# Patient Record
Sex: FEMALE | Race: WHITE | NOT HISPANIC OR LATINO | URBAN - METROPOLITAN AREA
[De-identification: names, ages, dates, MRNs, and addresses within clinical notes are randomized per-mention and may not be internally consistent; named-entity substitution may affect disease eponyms.]

---

## 2021-08-30 ENCOUNTER — APPOINTMENT (RX ONLY)
Dept: URBAN - METROPOLITAN AREA CLINIC 26 | Facility: CLINIC | Age: 46
Setting detail: DERMATOLOGY
End: 2021-08-30

## 2021-08-30 DIAGNOSIS — L81.4 OTHER MELANIN HYPERPIGMENTATION: ICD-10-CM

## 2021-08-30 PROCEDURE — ? TREATMENT REGIMEN

## 2021-08-30 PROCEDURE — 99202 OFFICE O/P NEW SF 15 MIN: CPT

## 2021-08-30 PROCEDURE — ? OBSERVATION AND MEASURE

## 2021-08-30 PROCEDURE — ? ADDITIONAL NOTES

## 2021-08-30 PROCEDURE — ? PHOTO-DOCUMENTATION

## 2021-08-30 PROCEDURE — ? COUNSELING

## 2021-08-30 ASSESSMENT — LOCATION ZONE DERM: LOCATION ZONE: ARM

## 2021-08-30 ASSESSMENT — LOCATION DETAILED DESCRIPTION DERM: LOCATION DETAILED: RIGHT DISTAL DORSAL FOREARM

## 2021-08-30 ASSESSMENT — LOCATION SIMPLE DESCRIPTION DERM: LOCATION SIMPLE: RIGHT FOREARM

## 2021-08-30 NOTE — PROCEDURE: MIPS QUALITY
Quality 431: Preventive Care And Screening: Unhealthy Alcohol Use - Screening: Patient identified as an unhealthy alcohol user when screened for unhealthy alcohol use using a systematic screening method and received brief counseling
Quality 226: Preventive Care And Screening: Tobacco Use: Screening And Cessation Intervention: Patient screened for tobacco use and is an ex/non-smoker
Quality 130: Documentation Of Current Medications In The Medical Record: Current Medications Documented
Detail Level: Detailed

## 2022-10-26 ENCOUNTER — APPOINTMENT (RX ONLY)
Dept: URBAN - METROPOLITAN AREA CLINIC 26 | Facility: CLINIC | Age: 47
Setting detail: DERMATOLOGY
End: 2022-10-26

## 2022-10-26 DIAGNOSIS — D69.2 OTHER NONTHROMBOCYTOPENIC PURPURA: ICD-10-CM

## 2022-10-26 PROBLEM — L30.9 DERMATITIS, UNSPECIFIED: Status: ACTIVE | Noted: 2022-10-26

## 2022-10-26 PROCEDURE — ? BIOPSY BY SHAVE METHOD

## 2022-10-26 PROCEDURE — 11102 TANGNTL BX SKIN SINGLE LES: CPT

## 2022-10-26 ASSESSMENT — LOCATION SIMPLE DESCRIPTION DERM: LOCATION SIMPLE: PLANTAR SURFACE OF LEFT 1ST TOE

## 2022-10-26 ASSESSMENT — LOCATION DETAILED DESCRIPTION DERM: LOCATION DETAILED: TIP OF LEFT 1ST TOE

## 2022-10-26 ASSESSMENT — LOCATION ZONE DERM: LOCATION ZONE: TOE

## 2023-03-09 ENCOUNTER — APPOINTMENT (RX ONLY)
Dept: URBAN - METROPOLITAN AREA CLINIC 26 | Facility: CLINIC | Age: 48
Setting detail: DERMATOLOGY
End: 2023-03-09

## 2023-03-09 DIAGNOSIS — D18.0 HEMANGIOMA: ICD-10-CM

## 2023-03-09 DIAGNOSIS — L81.4 OTHER MELANIN HYPERPIGMENTATION: ICD-10-CM

## 2023-03-09 DIAGNOSIS — T07XXXA ABRASION OR FRICTION BURN OF OTHER, MULTIPLE, AND UNSPECIFIED SITES, WITHOUT MENTION OF INFECTION: ICD-10-CM

## 2023-03-09 DIAGNOSIS — L85.3 XEROSIS CUTIS: ICD-10-CM

## 2023-03-09 DIAGNOSIS — L81.5 LEUKODERMA, NOT ELSEWHERE CLASSIFIED: ICD-10-CM

## 2023-03-09 DIAGNOSIS — L82.1 OTHER SEBORRHEIC KERATOSIS: ICD-10-CM

## 2023-03-09 DIAGNOSIS — D22 MELANOCYTIC NEVI: ICD-10-CM

## 2023-03-09 PROBLEM — S10.81XA ABRASION OF OTHER SPECIFIED PART OF NECK, INITIAL ENCOUNTER: Status: ACTIVE | Noted: 2023-03-09

## 2023-03-09 PROBLEM — D22.5 MELANOCYTIC NEVI OF TRUNK: Status: ACTIVE | Noted: 2023-03-09

## 2023-03-09 PROBLEM — D18.01 HEMANGIOMA OF SKIN AND SUBCUTANEOUS TISSUE: Status: ACTIVE | Noted: 2023-03-09

## 2023-03-09 PROCEDURE — ? SUNSCREEN RECOMMENDATIONS

## 2023-03-09 PROCEDURE — ? GENTLE SKIN CARE INSTRUCTIONS

## 2023-03-09 PROCEDURE — ? OBSERVATION

## 2023-03-09 PROCEDURE — 99213 OFFICE O/P EST LOW 20 MIN: CPT

## 2023-03-09 PROCEDURE — ? FULL BODY SKIN EXAM

## 2023-03-09 PROCEDURE — ? ADDITIONAL NOTES

## 2023-03-09 PROCEDURE — ? COUNSELING

## 2023-03-09 ASSESSMENT — LOCATION ZONE DERM
LOCATION ZONE: ARM
LOCATION ZONE: TRUNK
LOCATION ZONE: HAND
LOCATION ZONE: NECK
LOCATION ZONE: LEG

## 2023-03-09 ASSESSMENT — LOCATION SIMPLE DESCRIPTION DERM
LOCATION SIMPLE: CHEST
LOCATION SIMPLE: RIGHT FOREARM
LOCATION SIMPLE: NECK
LOCATION SIMPLE: RIGHT UPPER BACK
LOCATION SIMPLE: RIGHT PRETIBIAL REGION
LOCATION SIMPLE: LEFT HAND
LOCATION SIMPLE: LEFT PRETIBIAL REGION
LOCATION SIMPLE: RIGHT HAND
LOCATION SIMPLE: RIGHT LOWER BACK
LOCATION SIMPLE: UPPER BACK

## 2023-03-09 ASSESSMENT — LOCATION DETAILED DESCRIPTION DERM
LOCATION DETAILED: LEFT PROXIMAL PRETIBIAL REGION
LOCATION DETAILED: LEFT ULNAR DORSAL HAND
LOCATION DETAILED: MIDDLE STERNUM
LOCATION DETAILED: RIGHT SUPERIOR LATERAL NECK
LOCATION DETAILED: RIGHT RADIAL DORSAL HAND
LOCATION DETAILED: RIGHT PROXIMAL PRETIBIAL REGION
LOCATION DETAILED: RIGHT INFERIOR LATERAL MIDBACK
LOCATION DETAILED: RIGHT PROXIMAL DORSAL FOREARM
LOCATION DETAILED: SUPERIOR THORACIC SPINE
LOCATION DETAILED: RIGHT INFERIOR UPPER BACK

## 2023-03-09 NOTE — PROCEDURE: ADDITIONAL NOTES
Render Risk Assessment In Note?: no
Detail Level: Simple
Additional Notes: Picking x 2 months. Yellow pustule in center of hemorrhagic crust. Suspect excoriated folliculitis. Stop picking and let heal and follow up if unresolved.
Additional Notes: Very few nevi

## 2023-07-11 ENCOUNTER — TELEPHONE (OUTPATIENT)
Dept: NEUROSURGERY | Facility: CLINIC | Age: 48
End: 2023-07-11
Payer: COMMERCIAL
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
## 2023-07-17 DIAGNOSIS — M47.812 CERVICAL SPONDYLOSIS: Primary | ICD-10-CM

## 2023-07-17 NOTE — TELEPHONE ENCOUNTER
Patient scheduled 8/17 @ 1pm. NP Advised to bring Remy MRI disc and Antonio xray disc. Advised to have xray prior and bring disc. NP ppw and xray script emailed.

## 2023-07-20 ENCOUNTER — HOSPITAL ENCOUNTER (OUTPATIENT)
Dept: RADIOLOGY | Age: 48
Discharge: HOME | End: 2023-07-20
Attending: PHYSICIAN ASSISTANT
Payer: COMMERCIAL

## 2023-07-20 DIAGNOSIS — M47.812 CERVICAL SPONDYLOSIS: ICD-10-CM

## 2023-07-20 PROCEDURE — 72052 X-RAY EXAM NECK SPINE 6/>VWS: CPT

## 2023-08-23 ENCOUNTER — OFFICE VISIT (OUTPATIENT)
Dept: NEUROSURGERY | Facility: CLINIC | Age: 48
End: 2023-08-23
Payer: COMMERCIAL

## 2023-08-23 VITALS
BODY MASS INDEX: 22.36 KG/M2 | RESPIRATION RATE: 14 BRPM | OXYGEN SATURATION: 99 % | DIASTOLIC BLOOD PRESSURE: 72 MMHG | SYSTOLIC BLOOD PRESSURE: 141 MMHG | HEIGHT: 69 IN | TEMPERATURE: 97.2 F | HEART RATE: 58 BPM | WEIGHT: 151 LBS

## 2023-08-23 DIAGNOSIS — M47.12 CERVICAL SPONDYLOSIS WITH MYELOPATHY: Primary | ICD-10-CM

## 2023-08-23 PROCEDURE — 99205 OFFICE O/P NEW HI 60 MIN: CPT | Performed by: NEUROLOGICAL SURGERY

## 2023-08-23 PROCEDURE — 3008F BODY MASS INDEX DOCD: CPT | Performed by: NEUROLOGICAL SURGERY

## 2023-08-23 NOTE — LETTER
"August 23, 2023     RITA Munoz  [address] [facility]  [address]  [place] [address]    Patient: Eileen Ma  YOB: 1975  Date of Visit: 8/23/2023      Dear Dr. White:    Thank you for referring Eileen Ma to me for evaluation. Below are my notes for this consultation.    If you have questions, please do not hesitate to call me. I look forward to following your patient along with you.         Sincerely,        Doc Crawford MD        CC: No Recipients    Doc Crawford MD  8/23/2023 11:08 AM  Signed  Dear Viridiana,    I had the pleasure of meeting a patient of yours in the office today.  Thank you for your kind referral.  As you may recall, Mrs. Eileen Ma is a pleasant 48-year-old female who is presenting with upper extremity paresthesias and numbness is primarily present with extension maneuvers of her neck and with sleep.  This has been occurring since January 2021 but has been noticeably worse in recent months.  She is not currently experiencing any neck pain or cervical radiculopathy.  However she has almost constant pins-and-needles which progresses to numbness in her arms, hands, and back of her head when flat in bed.  She has worked on repositioning and finds that some of the symptoms have improved with repositioning of late.  At times when her symptoms are severe she can also feel sensory changes in her lower extremities.  The symptoms do not occur as often when she is upright and moving around.  In fact she states that with activity only paresthesias and numbness are better.  However, upon further questioning, does feel as if her hands do have \"a burning sensation to them\" even now.  She has noticed left upper extremity fasciculations upon extension maneuvers of her neck which improves with flexion.  In addition she has spots of stable numbness in the hands, primarily in the first and second digits, but also on the palmar surfaces and dorsal surfaces of her hands which " can last for weeks at a time.  She denies any focal neurologic deficits, loss of hand function even temporarily, difficulty with fine motor skills, dropping items, gait instability, falls, or change in bladder or bowel function.  She has obtained multiple surgical opinions, primarily due to the differing options that have been presented to her, and presents today for a Neurosurgical consultation.     Imaging: I personally reviewed her 2016 and current MRI studies of the cervical spine and ordered x-rays with static and dynamic views.  5716 MRI shows a diffuse disc herniation centrally at C4-5 level compressing the spinal cord without causing obvious signal change although there may be very subtle signal change.  The disc height was fairly well-preserved on that study.  Her 2023 MRI shows increased loss of height by 2 to 3 mm with increased bulging of the disc material into the canal.  Her spinal cord is now quite flattened and there is signal change in the cord.  Her x-rays show the loss of height at C4-5 and overall straightening of the normal cervical lordosis but there is no evidence of instability.  The remaining levels of her spine appear very healthy.                2016 MRI showing         2023 MRI           Review of Systems: 14 point review of systems are negative except for the following pertinent positives: Neuropathy, numbness, and anxiety.    Medical History:   Past Medical History:   Diagnosis Date   • Celiac disease        Surgical History: History reviewed. No pertinent surgical history.    Social History:   Social History     Socioeconomic History   • Marital status:      Spouse name: None   • Number of children: None   • Years of education: None   • Highest education level: None       Family History: History reviewed. No pertinent family history.    Allergies: Gadolinium-containing contrast media and Gluten    Home Medications:  Not in a hospital admission.    Current Medications:  •   ergocalciferol, vitamin D2, (VITAMIN D2 ORAL)      Physicial Exam    Vital Signs   Vitals:    08/23/23 0954   BP: (!) 141/72   Pulse: (!) 58   Resp: 14   Temp: 36.2 °C (97.2 °F)   SpO2: 99%       Awake, alert, oriented to person, place, time and situation; speech and fund of knowledge normal. Head NC/AT.  PERRL, extra-ocular movements intact, face symmetric.  Neck is supple, has full range of motion.  There is no tenderness.  Breathing comfortably without distress, tachypnea, wheezing or use of accessory muscles.  Heart has a regular rate. Abdomen ND.   Skin is warm, well perfused.  Limbs show no deformities or edema. Muscle bulk and tone are normal.     Neurological examination:  Motor:     RUE:  D  5/5, B  5/5, T 5/5, WE 5/5, HG 5/5, IH 5/5   LUE:  D  5/5, B  5/5, T 5/5, WE 5/5, HG 5/5, IH 5/5   RLE:  IP  5/5, Q  5/5, DF 5/5, EHL 5/5, PF 5/5   LLE:  IP  5/5, Q  5/5, DF 5/5, EHL 5/5, PF 5/5  Reflexes:  Normoreflexive, no Melgar's, no clonus  Sensory exam:  Intact to light touch however she has mild numbness with light touch in bilateral hands.   Gait and posture normal.      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  She has early symptoms consistent with myelopathy but no objective findings of myelopathy on examination such as pathological reflexes, etc.  I do believe she is a candidate for surgery.  She has seen for prior opinions between orthopedic and neurosurgical spinal surgeons.  I am her fifth opinion.  Given that she has symptoms, although no objective signs of myelopathy, and that they have been worsening in terms of hand and arm numbness, twitching and tremoring of her arms in certain positions, I do believe she is symptomatic from this and would benefit from surgery.  She is looking for a definitive answer to wait or proceed with surgery and a definitive answer on the optimal surgery.    We discussed both C4-5 ACDF and total disc  replacement (arthroplasty).  I discussed the pros and cons of each approach and how I go about selecting patients for either approach.  I do believe she is a candidate for either procedure.  Concerning arthroplasty, she does not have much loss of height of the disc space, nor does she have excessive spondylosis and osteophyte formation or instability that would contraindicate it.  I also do not recommend arthroplasty in the setting of congenital canal narrowing but her canal overall appears very patent except at the level she has the herniated disc.  Therefore, given her age, she is a good candidate for arthroplasty in my opinion.  She is very concerned about having a mobile device in her neck and is very concerned about the case reports of movement of the device or migration of the device.  I explained that is a rare occurrence.  We also discussed the other possibilities and risks of fusion such as failed fusion and we discussed adjacent segment degeneration in detail.  She would feel more comfortable, she states, with a fusion as opposed to an arthroplasty and I explained this is a perfectly valid surgery and course of treatment.  It is a tried and true method that has been around since 1958, although our technologies have improved.    She is considering having surgery at [facility] [place] as they will have more resources available in her opinion.  I reassured her that she is in excellent hands with Dr. Robbins.  All questions were answered to the best of my abilities.  Thank you very much for the opportunity to be involved in the care of your patient.  Please call the office should any questions or problems arise.    Sincerely,       Doc Crawford MD

## 2023-08-23 NOTE — PROGRESS NOTES
"Dear Viridiana,    I had the pleasure of meeting a patient of yours in the office today.  Thank you for your kind referral.  As you may recall, Mrs. Eileen Ma is a pleasant 48-year-old female who is presenting with upper extremity paresthesias and numbness is primarily present with extension maneuvers of her neck and with sleep.  This has been occurring since January 2021 but has been noticeably worse in recent months.  She is not currently experiencing any neck pain or cervical radiculopathy.  However she has almost constant pins-and-needles which progresses to numbness in her arms, hands, and back of her head when flat in bed.  She has worked on repositioning and finds that some of the symptoms have improved with repositioning of late.  At times when her symptoms are severe she can also feel sensory changes in her lower extremities.  The symptoms do not occur as often when she is upright and moving around.  In fact she states that with activity only paresthesias and numbness are better.  However, upon further questioning, does feel as if her hands do have \"a burning sensation to them\" even now.  She has noticed left upper extremity fasciculations upon extension maneuvers of her neck which improves with flexion.  In addition she has spots of stable numbness in the hands, primarily in the first and second digits, but also on the palmar surfaces and dorsal surfaces of her hands which can last for weeks at a time.  She denies any focal neurologic deficits, loss of hand function even temporarily, difficulty with fine motor skills, dropping items, gait instability, falls, or change in bladder or bowel function.  She has obtained multiple surgical opinions, primarily due to the differing options that have been presented to her, and presents today for a Neurosurgical consultation.     Imaging: I personally reviewed her 2016 and current MRI studies of the cervical spine and ordered x-rays with static and dynamic views.  " 5716 MRI shows a diffuse disc herniation centrally at C4-5 level compressing the spinal cord without causing obvious signal change although there may be very subtle signal change.  The disc height was fairly well-preserved on that study.  Her 2023 MRI shows increased loss of height by 2 to 3 mm with increased bulging of the disc material into the canal.  Her spinal cord is now quite flattened and there is signal change in the cord.  Her x-rays show the loss of height at C4-5 and overall straightening of the normal cervical lordosis but there is no evidence of instability.  The remaining levels of her spine appear very healthy.                2016 MRI showing         2023 MRI           Review of Systems: 14 point review of systems are negative except for the following pertinent positives: Neuropathy, numbness, and anxiety.    Medical History:   Past Medical History:   Diagnosis Date   • Celiac disease        Surgical History: History reviewed. No pertinent surgical history.    Social History:   Social History     Socioeconomic History   • Marital status:      Spouse name: None   • Number of children: None   • Years of education: None   • Highest education level: None       Family History: History reviewed. No pertinent family history.    Allergies: Gadolinium-containing contrast media and Gluten    Home Medications:  Not in a hospital admission.    Current Medications:  •  ergocalciferol, vitamin D2, (VITAMIN D2 ORAL)      Physicial Exam    Vital Signs   Vitals:    08/23/23 0954   BP: (!) 141/72   Pulse: (!) 58   Resp: 14   Temp: 36.2 °C (97.2 °F)   SpO2: 99%       Awake, alert, oriented to person, place, time and situation; speech and fund of knowledge normal. Head NC/AT.  PERRL, extra-ocular movements intact, face symmetric.  Neck is supple, has full range of motion.  There is no tenderness.  Breathing comfortably without distress, tachypnea, wheezing or use of accessory muscles.  Heart has a regular rate.  Abdomen ND.   Skin is warm, well perfused.  Limbs show no deformities or edema. Muscle bulk and tone are normal.     Neurological examination:  Motor:     RUE:  D  5/5, B  5/5, T 5/5, WE 5/5, HG 5/5, IH 5/5   LUE:  D  5/5, B  5/5, T 5/5, WE 5/5, HG 5/5, IH 5/5   RLE:  IP  5/5, Q  5/5, DF 5/5, EHL 5/5, PF 5/5   LLE:  IP  5/5, Q  5/5, DF 5/5, EHL 5/5, PF 5/5  Reflexes:  Normoreflexive, no Melgar's, no clonus  Sensory exam:  Intact to light touch however she has mild numbness with light touch in bilateral hands.   Gait and posture normal.      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  She has early symptoms consistent with myelopathy but no objective findings of myelopathy on examination such as pathological reflexes, etc.  I do believe she is a candidate for surgery.  She has seen for prior opinions between orthopedic and neurosurgical spinal surgeons.  I am her fifth opinion.  Given that she has symptoms, although no objective signs of myelopathy, and that they have been worsening in terms of hand and arm numbness, twitching and tremoring of her arms in certain positions, I do believe she is symptomatic from this and would benefit from surgery.  She is looking for a definitive answer to wait or proceed with surgery and a definitive answer on the optimal surgery.    We discussed both C4-5 ACDF and total disc replacement (arthroplasty).  I discussed the pros and cons of each approach and how I go about selecting patients for either approach.  I do believe she is a candidate for either procedure.  Concerning arthroplasty, she does not have much loss of height of the disc space, nor does she have excessive spondylosis and osteophyte formation or instability that would contraindicate it.  I also do not recommend arthroplasty in the setting of congenital canal narrowing but her canal overall appears very patent except at the level she has the herniated  disc.  Therefore, given her age, she is a good candidate for arthroplasty in my opinion.  She is very concerned about having a mobile device in her neck and is very concerned about the case reports of movement of the device or migration of the device.  I explained that is a rare occurrence.  We also discussed the other possibilities and risks of fusion such as failed fusion and we discussed adjacent segment degeneration in detail.  She would feel more comfortable, she states, with a fusion as opposed to an arthroplasty and I explained this is a perfectly valid surgery and course of treatment.  It is a tried and true method that has been around since 1958, although our technologies have improved.    She is considering having surgery at [facility] as they will have more resources available in her opinion.  I reassured her that she is in excellent hands with Dr. Robbins.  All questions were answered to the best of my abilities.  Thank you very much for the opportunity to be involved in the care of your patient.  Please call the office should any questions or problems arise.    Sincerely,       Doc Crawford MD

## 2024-04-23 ENCOUNTER — APPOINTMENT (RX ONLY)
Dept: URBAN - METROPOLITAN AREA CLINIC 26 | Facility: CLINIC | Age: 49
Setting detail: DERMATOLOGY
End: 2024-04-23

## 2024-04-23 DIAGNOSIS — D485 NEOPLASM OF UNCERTAIN BEHAVIOR OF SKIN: ICD-10-CM

## 2024-04-23 PROBLEM — D48.5 NEOPLASM OF UNCERTAIN BEHAVIOR OF SKIN: Status: ACTIVE | Noted: 2024-04-23

## 2024-04-23 PROCEDURE — ? COUNSELING

## 2024-04-23 PROCEDURE — 11104 PUNCH BX SKIN SINGLE LESION: CPT

## 2024-04-23 PROCEDURE — ? BIOPSY BY PUNCH METHOD

## 2024-04-23 ASSESSMENT — LOCATION DETAILED DESCRIPTION DERM: LOCATION DETAILED: RIGHT LATERAL SUPERIOR CHEST

## 2024-04-23 ASSESSMENT — LOCATION SIMPLE DESCRIPTION DERM: LOCATION SIMPLE: CHEST

## 2024-04-23 ASSESSMENT — LOCATION ZONE DERM: LOCATION ZONE: TRUNK

## 2024-04-23 NOTE — PROCEDURE: BIOPSY BY PUNCH METHOD
Detail Level: Detailed
Was A Bandage Applied: Yes
Punch Size In Mm: 4
Size Of Lesion In Cm (Optional): 0
Depth Of Punch Biopsy: fat
Biopsy Type: H and E
Anesthesia Type: 1% lidocaine with epinephrine
Anesthesia Volume In Cc: 0.5
Hemostasis: None
Epidermal Sutures: 5-0 Fast Absorbing Gut
Wound Care: Petrolatum
Dressing: bandage
Patient Will Remove Sutures At Home?: No
Lab: -[id number]
Lab Facility: [id number]
Consent: Written consent was obtained and risks were reviewed including but not limited to scarring, infection, bleeding, scabbing, incomplete removal, nerve damage and allergy to anesthesia.
Post-Care Instructions: I reviewed with the patient in detail post-care instructions. Patient is to keep the biopsy site dry overnight, and then apply bacitracin twice daily until healed. Patient may apply hydrogen peroxide soaks to remove any crusting.
Home Suture Removal Text: Patient was provided a home suture removal kit and will remove their sutures at home.  If they have any questions or difficulties they will call the office.
Notification Instructions: Patient will be notified of biopsy results. However, patient instructed to call the office if not contacted within 2 weeks.
Billing Type: Third-Party Bill
Information: Selecting Yes will display possible errors in your note based on the variables you have selected. This validation is only offered as a suggestion for you. PLEASE NOTE THAT THE VALIDATION TEXT WILL BE REMOVED WHEN YOU FINALIZE YOUR NOTE. IF YOU WANT TO FAX A PRELIMINARY NOTE YOU WILL NEED TO TOGGLE THIS TO 'NO' IF YOU DO NOT WANT IT IN YOUR FAXED NOTE.